# Patient Record
Sex: FEMALE | Race: WHITE | NOT HISPANIC OR LATINO | Employment: STUDENT | URBAN - METROPOLITAN AREA
[De-identification: names, ages, dates, MRNs, and addresses within clinical notes are randomized per-mention and may not be internally consistent; named-entity substitution may affect disease eponyms.]

---
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
## 2021-07-28 ENCOUNTER — OFFICE VISIT (OUTPATIENT)
Dept: OBSTETRICS AND GYNECOLOGY | Facility: CLINIC | Age: 21
End: 2021-07-28
Payer: MEDICAID

## 2021-07-28 VITALS
SYSTOLIC BLOOD PRESSURE: 102 MMHG | BODY MASS INDEX: 21.92 KG/M2 | DIASTOLIC BLOOD PRESSURE: 76 MMHG | WEIGHT: 112.19 LBS

## 2021-07-28 DIAGNOSIS — Z01.419 ENCOUNTER FOR WELL WOMAN EXAM WITH ROUTINE GYNECOLOGICAL EXAM: Primary | ICD-10-CM

## 2021-07-28 PROCEDURE — 99999 PR PBB SHADOW E&M-EST. PATIENT-LVL III: CPT | Mod: PBBFAC,,, | Performed by: OBSTETRICS & GYNECOLOGY

## 2021-07-28 PROCEDURE — 99385 PR PREVENTIVE VISIT,NEW,18-39: ICD-10-PCS | Mod: S$PBB,,, | Performed by: OBSTETRICS & GYNECOLOGY

## 2021-07-28 PROCEDURE — 99213 OFFICE O/P EST LOW 20 MIN: CPT | Mod: PBBFAC | Performed by: OBSTETRICS & GYNECOLOGY

## 2021-07-28 PROCEDURE — 99385 PREV VISIT NEW AGE 18-39: CPT | Mod: S$PBB,,, | Performed by: OBSTETRICS & GYNECOLOGY

## 2021-07-28 PROCEDURE — 99999 PR PBB SHADOW E&M-EST. PATIENT-LVL III: ICD-10-PCS | Mod: PBBFAC,,, | Performed by: OBSTETRICS & GYNECOLOGY

## 2022-03-28 ENCOUNTER — OFFICE VISIT (OUTPATIENT)
Dept: OBSTETRICS AND GYNECOLOGY | Facility: CLINIC | Age: 22
End: 2022-03-28
Payer: MEDICAID

## 2022-03-28 VITALS
DIASTOLIC BLOOD PRESSURE: 64 MMHG | WEIGHT: 109.38 LBS | BODY MASS INDEX: 21.36 KG/M2 | SYSTOLIC BLOOD PRESSURE: 106 MMHG

## 2022-03-28 DIAGNOSIS — Z12.4 ENCOUNTER FOR PAPANICOLAOU SMEAR FOR CERVICAL CANCER SCREENING: Primary | ICD-10-CM

## 2022-03-28 PROCEDURE — 99999 PR PBB SHADOW E&M-EST. PATIENT-LVL II: CPT | Mod: PBBFAC,,, | Performed by: OBSTETRICS & GYNECOLOGY

## 2022-03-28 PROCEDURE — 3008F PR BODY MASS INDEX (BMI) DOCUMENTED: ICD-10-PCS | Mod: CPTII,,, | Performed by: OBSTETRICS & GYNECOLOGY

## 2022-03-28 PROCEDURE — 3078F DIAST BP <80 MM HG: CPT | Mod: CPTII,,, | Performed by: OBSTETRICS & GYNECOLOGY

## 2022-03-28 PROCEDURE — 99999 PR PBB SHADOW E&M-EST. PATIENT-LVL II: ICD-10-PCS | Mod: PBBFAC,,, | Performed by: OBSTETRICS & GYNECOLOGY

## 2022-03-28 PROCEDURE — 3074F SYST BP LT 130 MM HG: CPT | Mod: CPTII,,, | Performed by: OBSTETRICS & GYNECOLOGY

## 2022-03-28 PROCEDURE — 99212 OFFICE O/P EST SF 10 MIN: CPT | Mod: PBBFAC | Performed by: OBSTETRICS & GYNECOLOGY

## 2022-03-28 PROCEDURE — 88175 CYTOPATH C/V AUTO FLUID REDO: CPT | Performed by: OBSTETRICS & GYNECOLOGY

## 2022-03-28 PROCEDURE — 3074F PR MOST RECENT SYSTOLIC BLOOD PRESSURE < 130 MM HG: ICD-10-PCS | Mod: CPTII,,, | Performed by: OBSTETRICS & GYNECOLOGY

## 2022-03-28 PROCEDURE — 99213 OFFICE O/P EST LOW 20 MIN: CPT | Mod: S$PBB,,, | Performed by: OBSTETRICS & GYNECOLOGY

## 2022-03-28 PROCEDURE — 99213 PR OFFICE/OUTPT VISIT, EST, LEVL III, 20-29 MIN: ICD-10-PCS | Mod: S$PBB,,, | Performed by: OBSTETRICS & GYNECOLOGY

## 2022-03-28 PROCEDURE — 3078F PR MOST RECENT DIASTOLIC BLOOD PRESSURE < 80 MM HG: ICD-10-PCS | Mod: CPTII,,, | Performed by: OBSTETRICS & GYNECOLOGY

## 2022-03-28 PROCEDURE — 3008F BODY MASS INDEX DOCD: CPT | Mod: CPTII,,, | Performed by: OBSTETRICS & GYNECOLOGY

## 2022-03-28 NOTE — PROGRESS NOTES
SUBJECTIVE:   21 y.o. female   for pap smear    No LMP recorded. Patient has had an injection..      Pt last annual was about 6 months ago  Desires to have pap today as she just turned 21.  Denies std testing as she recently had one  She has had 3 depo provera injection - her godmother own an medical spa and she has been getting prescription and injection from her godmother      OB History    Para Term  AB Living   0 0 0 0 0 0   SAB IAB Ectopic Multiple Live Births   0 0 0 0 0   Obstetric Comments   Gynhx: 12/reg/7.  Normal flow/cramp   H/o depo x 2 years   S/p gardasil      No past medical history on file.  No past surgical history on file.  Social History     Socioeconomic History    Marital status: Single   Tobacco Use    Smoking status: Current Some Day Smoker   Substance and Sexual Activity    Alcohol use: Never    Drug use: Yes     Types: Marijuana    Sexual activity: Yes     Partners: Male     Birth control/protection: None     No family history on file.      Current Outpatient Medications   Medication Sig Dispense Refill    cetirizine (ZYRTEC) 10 MG tablet Take 1 tablet (10 mg total) by mouth once daily. 30 tablet 0    fluticasone propionate (FLONASE) 50 mcg/actuation nasal spray 1 spray (50 mcg total) by Each Nare route 2 (two) times daily as needed for Rhinitis. 15 g 0    ibuprofen (ADVIL,MOTRIN) 600 MG tablet Take 1 tablet (600 mg total) by mouth every 6 (six) hours as needed for Pain. 20 tablet 0    phenol (THROAT SPRAY) 1.4 % SprA by Mucous Membrane route every 2 (two) hours as needed. Sore throat 177 mL 0     No current facility-administered medications for this visit.     Allergies: Patient has no known allergies.       ROS  ROS:  GENERAL: Denies weight gain or weight loss. Feeling well overall.   SKIN: Denies rash or lesions.   HEAD: Denies head injury or headache.   NODES: Denies enlarged lymph nodes.   CHEST: Denies chest pain or shortness of breath.    CARDIOVASCULAR: Denies palpitations or left sided chest pain.   ABDOMEN: No abdominal pain, constipation, diarrhea, nausea, vomiting or rectal bleeding.   URINARY: No frequency, dysuria, hematuria, or burning on urination.  REPRODUCTIVE: Denies vaginal discharge, abnormal vaginal bleeding, lesions, pelvic pain  BREASTS: The patient performs breast self-examination and denies pain, lumps, or nipple discharge.   HEMATOLOGIC: No easy bruisability or excessive bleeding.  MUSCULOSKELETAL: Denies joint pain or swelling.   NEUROLOGIC: Denies syncope or weakness.   PSYCHIATRIC: Denies depression, anxiety or mood swings.    OBJECTIVE:   /64   Wt 49.6 kg (109 lb 5.6 oz)   BMI 21.36 kg/m²   The patient appears well, alert, oriented x 3, in no distress.  NECK: negative, no thyromegaly, trachea midline  SKIN: normal, good color, good turgor and no acne, striae, hirsutism  BREAST EXAM: breasts appear normal, no suspicious masses, no skin or nipple changes or axillary nodes  ABDOMEN: soft, non-tender; bowel sounds normal; no masses,  no organomegaly and no hernias, masses, or hepatosplenomegaly  BLADDER: soft  GENITALIA: normal external genitalia, no erythema, no discharge  URETHRA: normal appearing urethra with no masses, tenderness or lesions and normal urethra, normal urethral meatus  VAGINA: Normal  CERVIX: no lesions or cervical motion tenderness  UTERUS: normal size, contour, position, consistency, mobility, non-tender  ADNEXA: no mass, fullness, tenderness      ASSESSMENT:   1. Pap done, discussed every 3 years pap if normal  2.  rtc in one year for wwe

## 2022-04-04 NOTE — PROGRESS NOTES
Hi,     Your pap smear is normal.  I will see you next year for your well woman exam.     Let me know if you have any questions.  Thanks.     Dr. Angeles Snell

## 2025-02-06 ENCOUNTER — HOSPITAL ENCOUNTER (EMERGENCY)
Facility: HOSPITAL | Age: 25
Discharge: HOME | End: 2025-02-07
Attending: STUDENT IN AN ORGANIZED HEALTH CARE EDUCATION/TRAINING PROGRAM
Payer: COMMERCIAL

## 2025-02-06 DIAGNOSIS — S01.81XA FACIAL LACERATION, INITIAL ENCOUNTER: Primary | ICD-10-CM

## 2025-02-06 LAB
B-HCG UR QL: NEGATIVE
CTP QC/QA: YES

## 2025-02-06 PROCEDURE — 81025 URINE PREGNANCY TEST: CPT | Performed by: STUDENT IN AN ORGANIZED HEALTH CARE EDUCATION/TRAINING PROGRAM

## 2025-02-06 PROCEDURE — 99282 EMERGENCY DEPT VISIT SF MDM: CPT | Mod: 25

## 2025-02-06 PROCEDURE — 12013 RPR F/E/E/N/L/M 2.6-5.0 CM: CPT

## 2025-02-06 RX ORDER — LIDOCAINE HYDROCHLORIDE AND EPINEPHRINE 10; 10 UG/ML; MG/ML
10 INJECTION, SOLUTION INFILTRATION; PERINEURAL ONCE
Status: COMPLETED | OUTPATIENT
Start: 2025-02-07 | End: 2025-02-07

## 2025-02-06 RX ORDER — DEXTROAMPHETAMINE SACCHARATE, AMPHETAMINE ASPARTATE, DEXTROAMPHETAMINE SULFATE AND AMPHETAMINE SULFATE 5; 5; 5; 5 MG/1; MG/1; MG/1; MG/1
1 TABLET ORAL 2 TIMES DAILY
COMMUNITY

## 2025-02-06 NOTE — Clinical Note
"Malcom"Malcommarin Olson was seen and treated in our emergency department on 2/6/2025.  She may return to work on 02/10/2025.  Please allow for light duty for the next 3 days.     If you have any questions or concerns, please don't hesitate to call.      Gigi Rowley PA-C"

## 2025-02-07 VITALS
HEART RATE: 86 BPM | TEMPERATURE: 98 F | RESPIRATION RATE: 20 BRPM | BODY MASS INDEX: 19.26 KG/M2 | WEIGHT: 102 LBS | SYSTOLIC BLOOD PRESSURE: 114 MMHG | HEIGHT: 61 IN | DIASTOLIC BLOOD PRESSURE: 74 MMHG | OXYGEN SATURATION: 100 %

## 2025-02-07 PROCEDURE — 63600175 PHARM REV CODE 636 W HCPCS: Performed by: STUDENT IN AN ORGANIZED HEALTH CARE EDUCATION/TRAINING PROGRAM

## 2025-02-07 RX ADMIN — LIDOCAINE HYDROCHLORIDE,EPINEPHRINE BITARTRATE 10 ML: 10; .01 INJECTION, SOLUTION INFILTRATION; PERINEURAL at 12:02

## 2025-02-07 NOTE — ED PROVIDER NOTES
Encounter Date: 2/6/2025       History     Chief Complaint   Patient presents with    Laceration     Pt presents to ED with complaint to laceration left eye brow. Pt reports putting a chair together with drill and it swung and hit eye brow. Pt denies loc but felt like she was going to. Pt denies blood thinner use.      24 y.o. female with history below presents for evaluation of laceration.  Patient reports right eyebrow laceration after using a drill and having recoil.  No loss of consciousness, not anticoagulated.  Up-to-date on tetanus.  The patient otherwise denies Chest Pain, Shortness of Breath, Abdominal Pain, N/V/D/Constipation, Eye complaints or Visual changes, Ear complaints, Neck or Back Pain, and Myalgias    Triage vitals were reviewed and are: Initial Vitals [02/06/25 2149]  BP: 111/69  Pulse: 95  Resp: 16  Temp: 98.3 °F (36.8 °C)  SpO2: 100 %  MAP: n/a    The Patient:   has no past medical history on file.   has no past surgical history on file.   reports that she has been smoking. She does not have any smokeless tobacco history on file. She reports current drug use. Drug: Marijuana. She reports that she does not drink alcohol.  Has allergies listed as: Patient has no known allergies.        The history is provided by the patient. No  was used.     Review of patient's allergies indicates:  No Known Allergies  History reviewed. No pertinent past medical history.  History reviewed. No pertinent surgical history.  No family history on file.  Social History     Tobacco Use    Smoking status: Some Days   Substance Use Topics    Alcohol use: Never    Drug use: Yes     Types: Marijuana     Review of Systems   All other systems reviewed and are negative.      Physical Exam     Initial Vitals [02/06/25 2149]   BP Pulse Resp Temp SpO2   111/69 95 16 98.3 °F (36.8 °C) 100 %      MAP       --         Physical Exam    Nursing note and vitals reviewed.  Constitutional: She appears well-developed  and well-nourished.   Body mass index is 19.27 kg/m².   HENT:   Head: Normocephalic.   3 cm laceration to the right eyebrow   Eyes: EOM are normal. Pupils are equal, round, and reactive to light.   Neck: Neck supple.   Cardiovascular:  Normal rate, regular rhythm and intact distal pulses.           Pulmonary/Chest: No respiratory distress.   Musculoskeletal:      Cervical back: Neck supple.     Neurological: She is alert and oriented to person, place, and time. GCS score is 15. GCS eye subscore is 4. GCS verbal subscore is 5. GCS motor subscore is 6.   Skin: Skin is warm and dry. Capillary refill takes less than 2 seconds.   Psychiatric: She has a normal mood and affect. Her behavior is normal.         ED Course   Lac Repair    Date/Time: 2/7/2025 12:29 AM    Performed by: Gigi Rowley PA-C  Authorized by: Lyle Lockwood MD    Comments:      Procedure/indications/benefits/risks/alternatives discussed with patient/guardian prior to the procedure.   Risks may include pain, bleeding, infection, reaction to injection components, damage to surrounding structures including nerve and vascular damage, failure to achieve intended goal, need for further treatment as well as other less likely risks.    Verbal consent obtained.      Time-Out completed:   - CORRECT PATIENT ID  - CORRECT SITE MARKED                        - PROCEDURE VERIFIED  - POSITIONING VERIFIED  - EQUIPMENT AVAILABLE  - RADIOGRAPHS AVAILABLE (If Needed)    3cm laceration noted to the face. This was cleaned, thoroughly evaluated and depth approximated at 5mm. The entire depth of the wound was visualized. No visualized foreign bodies. Wound was cleaned and extensively irrigated using 100mL sterile saline. Hemostasis was achieved using direct pressure. The wound was anesthetized with 1mL of Lidocaine 1% with epinephrine. The wound was then repaired using 5-0Vicrylsutures in a simple interrupted fashion. 4 sutures were placed. The wound was then  "cleaned again and dressed. Wound care, suture removal and return precautions were discussed with the patient. This was a simple level repair.      Labs Reviewed   POCT URINE PREGNANCY       Result Value    POC Preg Test, Ur Negative       Acceptable Yes            Imaging Results    None          Medications   LIDOcaine-EPINEPHrine 1%-1:100,000 injection 10 mL (10 mLs Intradermal Given 2/7/25 0016)     Medical Decision Making  Encounter Date: 2/6/2025  --------------------------------------------------------------------------  24 y.o. female presents for evaluation of eyebrow laceration.  Hemodynamically stable. Afebrile. Phonating and protecting the airway spontaneously. No clinical evidence for cardiovascular instability or impending airway compromise.  Remainder of physical exam as above.    Additional or Independent Historians available and contributing to the history as above: NONE  Prior medical records, when available, were reviewed. This includes a review of the patients comorbidities, medications, and recent hospital or outpatient notes.   Comorbid Conditions affecting evaluation, treatment or discharge planning: NONE IDENTIFIED   Social Determinates of Health identified and considered in the evaluation and treatment of this patient: None Identified or significantly impacting evaluation and treatment    Differential diagnoses includes but is not limited to:   Laceration, open fracture, skull fracture, ocular injury, head injury  --------------------------------------------------------------------------  All available clinical tests were reviewed by me and documented in the ED course.  Vitals range:   Temp:  [98.2 °F (36.8 °C)-98.3 °F (36.8 °C)] 98.2 °F (36.8 °C)  Pulse:  [86-95] 86  Resp:  [16-20] 20  SpO2:  [100 %] 100 %  BP: (111-114)/(69-74) 114/74  Estimated body mass index is 19.27 kg/m² as calculated from the following:    Height as of this encounter: 5' 1" (1.549 m).    Weight as of " this encounter: 46.3 kg (102 lb).    ED MEDS GIVEN:  Medications  LIDOcaine-EPINEPHrine 1%-1:100,000 injection 10 mL (10 mLs Intradermal Given 2/7/25 0016)    Procedures Performed: Wound Care //  Laceration Repair  --------------------------------------------------------------------------  Problem #1:  Facial laceration  The above patient presents emergency room for evaluation of laceration to the right eyebrow.  Patient is well-appearing, grossly neurologically intact.  She has a 3 cm laceration which was repaired primarily with absorbable sutures.  Patient and mother happy with cosmetic approximation.  I had a long discussion regarding wound care and scar prevention.  I discussed frequent cleaning with only soap and water and avoiding additional topical ointments.    I see no indication of an emergent process beyond that addressed during our encounter but have duly counseled the patient/family regarding the need for prompt follow-up as well as the indications that should prompt immediate return to the emergency room should new or worrisome developments occur.  The patient/family has been provided with verbal and printed direction regarding our final diagnosis(es) as well as instructions regarding use of OTC and/or Rx medications intended to manage the patient's conditions.   The patient/family communicates understanding of all this information and all remaining questions and concerns were addressed at this time.  DISCLAIMER: This note was prepared with [email or web address] voice recognition transcription software. Garbled syntax, mangled pronouns, and other bizarre constructions may be attributed to that software system.    Final diagnoses:  [S01.81XA] Facial laceration, initial encounter (Primary)                Amount and/or Complexity of Data Reviewed  Labs: ordered. Decision-making details documented in ED Course.    Risk  Prescription drug management.               ED Course as of 02/07/25 0134   u Feb 06, 2025   3050  BP: 111/69 [AN]   2334 Temp: 98.3 °F (36.8 °C) [AN]   2334 Pulse: 95 [AN]   2334 Resp: 16 [AN]   2334 SpO2: 100 % [AN]   2334 hCG Qualitative, Urine: Negative [AN]      ED Course User Index  [AN] Gigi Rowley PA-C                           Clinical Impression:  Final diagnoses:  [S01.81XA] Facial laceration, initial encounter (Primary)          ED Disposition Condition    Discharge Stable          ED Prescriptions    None       Follow-up Information       Follow up With Specialties Details Why Contact Info    [facility] - Emergency Dept Emergency Medicine Go to  As needed, If symptoms worsen [address] [facility] [place] [address] [phone]    Primary Care Manager  Schedule an appointment as soon as possible for a visit in 1 week               Gigi Rowley PA-C  02/07/25 0134

## 2025-02-07 NOTE — DISCHARGE INSTRUCTIONS
Problem Specific Instructions:  You had 4 stitches placed that should dissolve over time.  Keep all wounds clean and dry. You should wash your wound at least three times a day with non-scented soap and water, then apply a clean dressing. Monitor for signs of infection (redness, swelling, or discharge) or fever as return if any of these are present.  If you are concerned with scarring, apply thick moisturizer (petroleum jelly, etc.) daily and apply sunscreen every time you step outside once the wound has healed.      If you received or are discharged with pain medicine or muscle relaxers, understand that they can make you sleepy or impair your judgement. Do not make important decisions, drink, drive, swim or perform any other tasks you would not otherwise perform while impaired for at least 24 hours after your last dose.      Ensure you follow up with your Primary Care Provider or any additional providers listed on this discharge sheet. While you may be healthy enough to go home today, I cannot predict the exact course of your diagnoses. It is important to remember that some problems are difficult to diagnose and may not be found during your first visit. As such, it is your responsibility to monitor symptoms, follow-up with another healthcare provider, or return to the emergency room for new or worsening concerns. Unless otherwise instructed, continue all home medications and any new medications prescribed to you in the Emergency Department.